# Patient Record
Sex: MALE
[De-identification: names, ages, dates, MRNs, and addresses within clinical notes are randomized per-mention and may not be internally consistent; named-entity substitution may affect disease eponyms.]

---
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
## 2025-02-19 ENCOUNTER — OFFICE VISIT (OUTPATIENT)
Dept: FAMILY MEDICINE CLINIC | Facility: CLINIC | Age: 25
End: 2025-02-19

## 2025-02-19 VITALS
RESPIRATION RATE: 16 BRPM | HEIGHT: 75 IN | WEIGHT: 173 LBS | BODY MASS INDEX: 21.51 KG/M2 | DIASTOLIC BLOOD PRESSURE: 68 MMHG | HEART RATE: 79 BPM | OXYGEN SATURATION: 99 % | SYSTOLIC BLOOD PRESSURE: 98 MMHG | TEMPERATURE: 98 F

## 2025-02-19 DIAGNOSIS — Z00.00 ANNUAL PHYSICAL EXAM: Primary | ICD-10-CM

## 2025-02-19 PROBLEM — Q89.01 SPLEEN ABSENT: Status: ACTIVE | Noted: 2025-02-19

## 2025-02-19 PROBLEM — Z90.5 H/O UNILATERAL NEPHRECTOMY: Status: ACTIVE | Noted: 2025-02-19

## 2025-02-19 PROCEDURE — 99385 PREV VISIT NEW AGE 18-39: CPT | Performed by: NURSE PRACTITIONER

## 2025-02-19 NOTE — PROGRESS NOTES
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    - NUTRITION:  Follows a keto diet.  Drinks approximately 64-80oz of water a day.   - SLEEP:  Difficulty falling asleep.   - SAFETY:  Reports feeling safe at home and in relationship.     - PHYSICAL ACTIVITY/SOCIAL/HOBBIES:  Walking at work and focuses on calisthenics.   - GOAL:  Rajendra would like to improve health overall by improving sleep quality and mental health.  Increased agitation and feelings of depression, possibly situational - occurring every winter and with current relationship.  Agreeable to trial vitamin d3.  Also discussed possibility of low dose ashwhaganda 250mg nightly x 6-8 weeks.  Rajendra would like to defer labs today, discussed CMP, CBC, and TSH.    IMMUNIZATIONS:  Declines today    Patient Active Problem List   Diagnosis    Spleen absent    H/O unilateral nephrectomy      ALLERGIES:  Allergies[1]    CURRENT MEDICATIONS:  No current outpatient medications on file.       MEDICAL HISTORY:  History reviewed. No pertinent past medical history.  History reviewed. No pertinent surgical history.  History reviewed. No pertinent family history.  No family status information on file.     Social History     Socioeconomic History    Marital status: Single   Tobacco Use    Smoking status: Former     Types: Cigarettes    Smokeless tobacco: Current   Vaping Use    Vaping status: Former   Substance and Sexual Activity    Alcohol use: Yes     Comment: social    Drug use: Yes     Types: Cannabis       ROS:    GENERAL:  Denies fever or chills  RESPIRATORY:  Denies difficulty breathing  CARDIAC:  Denies chest pain with exertion  GI:  Denies nausea, vomiting, diarrhea, constipation, or blood in stool  :  Denies blood in urine or  painful urination  NEURO:  Denies recent falls   MSKL:  Denies joint stiffness or pain  SKIN:  Denies change in texture of moles   PSYCH: +hpi.  Denies thoughts of self harm or harming others     VITALS:    BP 98/68   Pulse 79   Temp 97.7 °F (36.5 °C) (Temporal)   Resp 16   Ht 6' 3\" (1.905 m)   Wt 173 lb (78.5 kg)   SpO2 99%   BMI 21.62 kg/m²     Ideal body weight: 84.5 kg (186 lb 4.6 oz)  Wt Readings from Last 3 Encounters:   02/19/25 173 lb (78.5 kg)   10/05/21 185 lb (83.9 kg) (86%, Z= 1.09)*   02/08/21 180 lb (81.6 kg) (85%, Z= 1.03)*     * Growth percentiles are based on [facility] (Boys, 2-20 Years) data.     BP Readings from Last 3 Encounters:   02/19/25 98/68   10/05/21 130/55   02/08/21 125/74     Reviewed by Meagan Trevino MS, APRN, FNP-BC    PHYSICAL EXAM:    Constitutional:       Appearance: Normal appearance.  Sitting upright on exam table.  Well developed, well nourished, and in no acute distress.  HENT:      Head: Facial features symmetric. Normocephalic and atraumatic.      Right Ear: Canal clear without erythema or drainage.  TM clear and intact, neutral in position.      Left Ear: Canal clear without erythema or drainage.  TM clear and intact, neutral in position.      Nose: Nose normal.      Mouth/Throat: Mucous membranes are moist.  Uvula rises midline.  Eyes:      Extraocular Movements: Extraocular movements intact.      Conjunctiva/sclera: Conjunctivae normal. Sclera anicteric         Pupils: Pupils are equal, round, and reactive to light.   Neck:     Neck is supple. Trachea is midline.  No masses.      No obvious lympadenopathy with palpation of submandibular, pre/posterior auricular, anterior/posterior cervical, occipital, and supraclavicular nodes.  Cardiovascular:      Heart sounds: Regular rate and rhythm without murmur.     BLE without edema.  Pulmonary:      Effort: Pulmonary effort is normal.      Breath sounds: Lungs clear throughout.     No cough or wheezing.  Abdominal:       General: Abdomen is nondistended, bowel sounds normoactive, soft, nontender.  No organomegaly.  Musculoskeletal:         General: Normal range of motion. Strength of extremities are equal bilaterally.     Range of motion without limitations.  Skin:     General: Skin is warm and dry.      Coloration: Skin is without jaundice     Findings: No bruising or rashes  Neurological:      General: No focal deficit present. Speech is clear and organized.     Mental Status: Alert and oriented to person, place, and time.      Sensory: Sensation is intact.      Motor: Motor function is intact. Movements are smooth and controlled without ataxia.     Coordination: Coordination is intact. Coordination normal.      Gait: Gait steady and nonantalgic.      Deep Tendon Reflexes: Reflexes 2+ bilaterally.  Psychiatric:         Mood and Affect: Mood normal.         Behavior: Behavior normal.         Thought Content: Thought content normal.         Judgment: Judgment normal.     ASSESSMENT & PLAN:  Plan on follow-up in 1-3 months or earlier if needed  Refer to result notes for further information    1. Annual physical exam  Rajendra expresses interest in checking all vitamin levels  Discussed going to [facility] to get an estimate as patient is self paying  Discussed routine labs, Lipid, CBC, CMP, and TSH  Rajendra expresses concern of low energy level, we discussed trial of vitamin d supplement   Follow-up in 1-3 months PRN  Rajendra would like to defer labs today       [1] No Known Allergies